# Patient Record
Sex: MALE | Race: WHITE | URBAN - METROPOLITAN AREA
[De-identification: names, ages, dates, MRNs, and addresses within clinical notes are randomized per-mention and may not be internally consistent; named-entity substitution may affect disease eponyms.]

---

## 2018-02-18 ENCOUNTER — OFFICE VISIT - HEALTHEAST (OUTPATIENT)
Dept: FAMILY MEDICINE | Facility: CLINIC | Age: 23
End: 2018-02-18

## 2018-02-18 DIAGNOSIS — R10.12 LUQ ABDOMINAL PAIN: ICD-10-CM

## 2021-05-27 ENCOUNTER — RECORDS - HEALTHEAST (OUTPATIENT)
Dept: ADMINISTRATIVE | Facility: CLINIC | Age: 26
End: 2021-05-27

## 2021-06-01 VITALS — WEIGHT: 173.6 LBS

## 2021-06-16 NOTE — PROGRESS NOTES
Assessment:       Abdominal pain, likely secondary to acid reflux .      Plan:       OTC Pepcid complete discussed  Recommended plenty of fluids  Discussed signs of worsening symptoms and when to follow-up with PCP.      Patient Instructions   You were seen today for left sided abdominal pain. The physical exam showed no signs of an acute illness. Recommend follow-up with your primary care provider within 24-48 hours for further evaluation.    Symptom management:  - Avoid spicy and acidic foods  - Drink plenty of non-caffeinated fluids  - May try Pepsid complete for possible acid reflux as a cause    Reasons to return immediately for re-evaluation:  - Fever of 100.4 or higher  - Worsening abdominal pain  - Blood or dark black in stool or vomiting      Subjective:        Shun Corbin is a 22 y.o. male who presents for evaluation of abdominal pain. Onset was 1 week ago. Symptoms have been coming and going. The pain is described as dull, and is 5/10 in intensity at its worst. Pain is located in the LUQ with radiation to back.  Aggravating factors: none known.  Alleviating factors: bowel movement and eating food. Associated symptoms: loss of appetite and constipation. Patient self-treated the constipation with prune juice and a stool softener and has been able to move bowels regularly since. The patient denies fever, nausea, vomiting, hematochezia, and melena. History significant for previous treatment with omeprazole for acid reflux.    The following portions of the patient's history were reviewed and updated as appropriate: allergies, current medications and problem list.    Review of Systems  Pertinent items are noted in HPI.     Allergies  No Known Allergies      Objective:       /66 (Patient Site: Right Arm, Patient Position: Sitting, Cuff Size: Adult Regular)  Pulse 69  Temp 98.3  F (36.8  C)  Resp 20  Wt 173 lb 9.6 oz (78.7 kg)  SpO2 97%  General appearance: alert, appears stated age,  cooperative, no distress and non-toxic  Head: Normocephalic, without obvious abnormality, atraumatic  Throat: lips, mucosa, and tongue normal; teeth and gums normal  Neck: no adenopathy and supple, symmetrical, trachea midline  Back: no CVA tenderness  Lungs: clear to auscultation bilaterally  Heart: regular rate and rhythm, S1, S2 normal, no murmur, click, rub or gallop  Abdomen: soft, non-tender; bowel sounds normal; no masses,  no organomegaly